# Patient Record
(demographics unavailable — no encounter records)

---

## 2025-04-09 NOTE — HISTORY OF PRESENT ILLNESS
[TextBox_4] : 65-year-old female who is a non-smoker, no significant past medical history, surgical history also noncontributory with history of orthopedic surgery, recently presented to the urgent care and to the ER with community-acquired pneumonia treated with 2 courses of antibiotics, repeat chest x-ray on 4/3 showing resolution.  Spirometry today without significant obstruction.  She notes mild dyspnea with exertion likely postinfectious reactive airway disease.  On exam her lungs are clear.  She otherwise is not febrile and has no further symptoms of pneumonia.  She is presenting for follow-up.

## 2025-04-09 NOTE — ASSESSMENT
[FreeTextEntry1] : Recent community-acquired pneumonia Status post antibiotic course Chest x-ray 4/3/25 with resolution of infiltrates Lungs are clear today on exam Postinfectious reactive airway disease getting better Non-smoker with no risk factors No further workup needed 3 months follow-up for complete resolution of symptoms